# Patient Record
Sex: FEMALE | Race: OTHER
[De-identification: names, ages, dates, MRNs, and addresses within clinical notes are randomized per-mention and may not be internally consistent; named-entity substitution may affect disease eponyms.]

---

## 2017-06-21 ENCOUNTER — HOSPITAL ENCOUNTER (EMERGENCY)
Dept: HOSPITAL 61 - ER | Age: 22
Discharge: HOME | End: 2017-06-21
Payer: COMMERCIAL

## 2017-06-21 VITALS — SYSTOLIC BLOOD PRESSURE: 111 MMHG | DIASTOLIC BLOOD PRESSURE: 72 MMHG

## 2017-06-21 DIAGNOSIS — Z88.0: ICD-10-CM

## 2017-06-21 DIAGNOSIS — J45.909: ICD-10-CM

## 2017-06-21 DIAGNOSIS — L03.031: Primary | ICD-10-CM

## 2017-06-21 PROCEDURE — 99283 EMERGENCY DEPT VISIT LOW MDM: CPT

## 2017-06-21 NOTE — PHYS DOC
Past Medical History


Past Medical History:  Asthma


Past Surgical History:  No Surgical History


Alcohol Use:  None


Drug Use:  None





Adult General


Chief Complaint


Chief Complaint:  TOE PROBLEM





[facility]


HPI





Patient is a 22  year old female presents emergency department stating that her 

right great toe has been having pain and discomfort for the last 2 days. She 

states that she attempted toenails and had pulled piece of skin back and it 

started bleeding. She states that she has had some thick yellow drainage coming 

from the site. She has been using peroxide and Epsom salts soaks with no 

relief. Patient denies taking anything for pain and discomfort. She denies fever

, chills or any nausea vomiting.





Review of Systems


Review of Systems





Constitutional: Denies fever or chills []


Eyes: Denies change in visual acuity, redness, or eye pain []


HENT: Denies nasal congestion or sore throat []


Respiratory: Denies cough or shortness of breath []


Cardiovascular: No additional information not addressed in HPI []


GI: Denies abdominal pain, nausea, vomiting, bloody stools or diarrhea []


: Denies dysuria or hematuria []


Musculoskeletal: Denies back pain or joint pain []


Integument: Denies rash or skin lesions. Complaint of redness with yellow 

drainage and tenderness on the medial part of the right great toe


Neurologic: Denies headache, focal weakness or sensory changes []


Endocrine: Denies polyuria or polydipsia []





Allergies


Allergies





Allergies








Coded Allergies Type Severity Reaction Last Updated Verified


 


  Penicillins Allergy Unknown  2/17/16 Yes











Physical Exam


Physical Exam





Constitutional: Well developed, well nourished, no acute distress, non-toxic 

appearance. []


HENT: Normocephalic, atraumatic, bilateral external ears normal, oropharynx 

moist, no oral exudates, nose normal. []


Eyes: PERRLA, EOMI, conjunctiva normal, no discharge. [] 


Neck: Normal range of motion, no tenderness, supple, no stridor. [] 


Cardiovascular:Heart rate regular rhythm


Lungs & Thorax: No respiratory distress noted 


Skin: Warm, dry, no erythema, no rash. [] 


Back: No tenderness


Extremities: No tenderness, no cyanosis, no clubbing, ROM intact, no edema. 

Patient's right great toe on the medial part appears to be inflamed with 

tenderness noted, minimal drainage noted from the area that appears to be clear 

to yellowish in color.


Neurologic: Alert and oriented X 3, normal motor function, normal sensory 

function, no focal deficits noted. []


Psychologic: Affect normal, judgement normal, mood normal. []





Current Patient Data


Vital Signs





 Vital Signs








  Date Time  Temp Pulse Resp B/P (MAP) Pulse Ox O2 Delivery O2 Flow Rate FiO2


 


6/21/17 22:51 98.8 78 19  99 Room Air  





 98.8       











EKG


EKG


[]





Radiology/Procedures


Radiology/Procedures


[]





Course & Med Decision Making


Course & Med Decision Making


Pertinent Labs and Imaging studies reviewed. (See chart for details)


Patient will be provided with Keflex for infection and she is breast-feeding. 

She'll be instructed to use Tylenol or ibuprofen for pain and discomfort. 

Continue with the warm Epsom salt soaks 4-5 times a day. Also recommended not 

using peroxide as it has been known to delay healing. Patient will be 

discharged home in stable condition. Signs and symptoms to return back to 

emergency department as been provided. Patient agrees with discharge 

instructions treatment regimens and follow-up recommendations.


[]





Dragon Disclaimer


Dragon Disclaimer


This electronic medical record was generated, in whole or in part, using a 

voice recognition dictation system.





Departure


Departure


Impression:  


 Primary Impression:  


 Paronychia of great toe, right


Disposition:  01 HOME, SELF-CARE


Condition:  STABLE


Referrals:  


NO PCP (PCP)


Patient Instructions:  Paronychia, Easy-to-Read





Additional Instructions:  


Activity as tolerated.


Medications as prescribed.


Tylenol for pain and discomfort.


Elevation as much as possible.


Warm Epsom salt soaks 4-5 times a day for 20 minutes at a time.


Avoid using peroxide on the areas as delays healing.


You may apply antibiotic ointment to the area after cleaning it with soap and 

water.


Follow-up to primary care physician in the next 3-5 days.


Return back to emergency department for signs and symptoms become worse.


Scripts


Cephalexin (CEPHALEXIN) 500 Mg Tablet


1 TAB PO BID, #20 TAB


   Prov: NISA HART         6/21/17











NISA HART Jun 21, 2017 23:03

## 2018-01-08 ENCOUNTER — HOSPITAL ENCOUNTER (EMERGENCY)
Dept: HOSPITAL 61 - ER | Age: 23
Discharge: HOME | End: 2018-01-08
Payer: SELF-PAY

## 2018-01-08 DIAGNOSIS — S39.012A: Primary | ICD-10-CM
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
## 2018-01-08 DIAGNOSIS — J45.909: ICD-10-CM
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
## 2018-01-08 DIAGNOSIS — Z88.0: ICD-10-CM
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
## 2018-01-08 LAB
BACTERIA #/AREA URNS HPF: (no result) /HPF
BILIRUBIN,URINE: NEGATIVE
CLARITY,URINE: CLEAR
COLOR,URINE: YELLOW
GLUCOSE,URINE: NEGATIVE MG/DL
NITRITE UR QL STRIP: NEGATIVE
PH,URINE: 5
PROTEIN,URINE: NEGATIVE MG/DL
RBC,URINE: 0 /HPF (ref 0–2)
SPECIFIC GRAVITY,URINE: 1.02
SQUAMOUS EPITHELIAL CELL,UR: (no result) /LPF
URINE HCG POC: (no result)
UROBILINOGEN,URINE: 0.2 MG/DL
WBC #/AREA URNS HPF: (no result) /HPF (ref 0–4)

## 2018-01-08 PROCEDURE — 71046 X-RAY EXAM CHEST 2 VIEWS: CPT

## 2018-01-08 PROCEDURE — 81001 URINALYSIS AUTO W/SCOPE: CPT

## 2018-01-08 PROCEDURE — 81025 URINE PREGNANCY TEST: CPT

## 2018-01-08 PROCEDURE — 72100 X-RAY EXAM L-S SPINE 2/3 VWS: CPT

## 2018-01-08 PROCEDURE — 99285 EMERGENCY DEPT VISIT HI MDM: CPT

## 2018-01-08 RX ADMIN — HYDROCODONE BITARTRATE AND ACETAMINOPHEN 1 TAB: 5; 325 TABLET ORAL at 14:28

## 2018-08-10 ENCOUNTER — HOSPITAL ENCOUNTER (EMERGENCY)
Dept: HOSPITAL 61 - ER | Age: 23
Discharge: HOME | End: 2018-08-10
Payer: SELF-PAY

## 2018-08-10 DIAGNOSIS — O03.9: Primary | ICD-10-CM

## 2018-08-10 DIAGNOSIS — Z88.0: ICD-10-CM

## 2018-08-10 DIAGNOSIS — Z3A.00: ICD-10-CM

## 2018-08-10 DIAGNOSIS — O99.519: ICD-10-CM

## 2018-08-10 DIAGNOSIS — J45.909: ICD-10-CM

## 2018-08-10 LAB
ADD MAN DIFF?: NO
ALBUMIN SERPL-MCNC: 3.8 G/DL (ref 3.4–5)
ALBUMIN/GLOB SERPL: 1.2 {RATIO} (ref 1–1.7)
ALP SERPL-CCNC: 77 U/L (ref 46–116)
ALT (SGPT): 21 U/L (ref 14–59)
ANION GAP SERPL CALC-SCNC: 10 MMOL/L (ref 6–14)
AST SERPL-CCNC: 18 U/L (ref 15–37)
BASO #: 0 X10^3/UL (ref 0–0.2)
BASO %: 0 % (ref 0–3)
BLOOD UREA NITROGEN: 7 MG/DL (ref 7–20)
BUN/CREAT SERPL: 14 (ref 6–20)
CALCIUM: 8.5 MG/DL (ref 8.5–10.1)
CHLORIDE: 102 MMOL/L (ref 98–107)
CO2 SERPL-SCNC: 24 MMOL/L (ref 21–32)
CREAT SERPL-MCNC: 0.5 MG/DL (ref 0.6–1)
EOS #: 0.4 X10^3/UL (ref 0–0.7)
EOS %: 4 % (ref 0–3)
GFR SERPLBLD BASED ON 1.73 SQ M-ARVRAT: 155.7 ML/MIN
GLOBULIN SER-MCNC: 3.3 G/DL (ref 2.2–3.8)
GLUCOSE SERPL-MCNC: 104 MG/DL (ref 70–99)
HCG SERPL-ACNC: 9.6 X10^3/UL (ref 4–11)
HEMATOCRIT: 37.7 % (ref 36–47)
HEMOGLOBIN: 12.7 G/DL (ref 12–15.5)
INR: 1.1 (ref 0.8–1.1)
LYMPH #: 2.2 X10^3/UL (ref 1–4.8)
LYMPH %: 23 % (ref 24–48)
MEAN CORPUSCULAR HEMOGLOBIN: 29 PG (ref 25–35)
MEAN CORPUSCULAR HGB CONC: 34 G/DL (ref 31–37)
MEAN CORPUSCULAR VOLUME: 85 FL (ref 79–100)
MONO #: 0.8 X10^3/UL (ref 0–1.1)
MONO %: 8 % (ref 0–9)
NEUT #: 6.3 X10^3UL (ref 1.8–7.7)
NEUT %: 65 % (ref 31–73)
PARTIAL THROMBOPLASTIN TIME: 30 SEC (ref 24–38)
PLATELET COUNT: 194 X10^3/UL (ref 140–400)
POTASSIUM SERPL-SCNC: 3.5 MMOL/L (ref 3.5–5.1)
PROTHROMBIN TIME PATIENT: 13.5 SEC (ref 11.7–14)
RED BLOOD COUNT: 4.45 X10^6/UL (ref 3.5–5.4)
RED CELL DISTRIBUTION WIDTH: 14.2 % (ref 11.5–14.5)
SODIUM: 136 MMOL/L (ref 136–145)
TOTAL BILIRUBIN: 0.4 MG/DL (ref 0.2–1)
TOTAL PROTEIN: 7.1 G/DL (ref 6.4–8.2)

## 2018-08-10 PROCEDURE — 85025 COMPLETE CBC W/AUTO DIFF WBC: CPT

## 2018-08-10 PROCEDURE — 99285 EMERGENCY DEPT VISIT HI MDM: CPT

## 2018-08-10 PROCEDURE — 76801 OB US < 14 WKS SINGLE FETUS: CPT

## 2018-08-10 PROCEDURE — 85610 PROTHROMBIN TIME: CPT

## 2018-08-10 PROCEDURE — 84702 CHORIONIC GONADOTROPIN TEST: CPT

## 2018-08-10 PROCEDURE — 86901 BLOOD TYPING SEROLOGIC RH(D): CPT

## 2018-08-10 PROCEDURE — 86900 BLOOD TYPING SEROLOGIC ABO: CPT

## 2018-08-10 PROCEDURE — 85730 THROMBOPLASTIN TIME PARTIAL: CPT

## 2018-08-10 PROCEDURE — 80053 COMPREHEN METABOLIC PANEL: CPT

## 2018-08-10 PROCEDURE — 76817 TRANSVAGINAL US OBSTETRIC: CPT

## 2018-08-10 PROCEDURE — 36415 COLL VENOUS BLD VENIPUNCTURE: CPT

## 2018-08-10 RX ADMIN — BACITRACIN 1 MLS/HR: 5000 INJECTION, POWDER, FOR SOLUTION INTRAMUSCULAR at 11:11

## 2019-03-04 ENCOUNTER — HOSPITAL ENCOUNTER (EMERGENCY)
Dept: HOSPITAL 61 - ER | Age: 24
Discharge: HOME | End: 2019-03-04
Payer: COMMERCIAL

## 2019-03-04 VITALS — BODY MASS INDEX: 30.18 KG/M2 | HEIGHT: 62 IN | WEIGHT: 164 LBS

## 2019-03-04 VITALS — SYSTOLIC BLOOD PRESSURE: 125 MMHG | DIASTOLIC BLOOD PRESSURE: 60 MMHG

## 2019-03-04 DIAGNOSIS — Z3A.16: ICD-10-CM

## 2019-03-04 DIAGNOSIS — O44.02: ICD-10-CM

## 2019-03-04 DIAGNOSIS — O99.512: ICD-10-CM

## 2019-03-04 DIAGNOSIS — Z88.0: ICD-10-CM

## 2019-03-04 DIAGNOSIS — O23.42: Primary | ICD-10-CM

## 2019-03-04 DIAGNOSIS — J45.909: ICD-10-CM

## 2019-03-04 LAB
AMORPH SED URNS QL MICRO: PRESENT /HPF
APTT PPP: YELLOW S
BACTERIA #/AREA URNS HPF: (no result) /HPF
BILIRUB UR QL STRIP: NEGATIVE
FIBRINOGEN PPP-MCNC: (no result) MG/DL
NITRITE UR QL STRIP: NEGATIVE
PH UR STRIP: 7 [PH]
PROT UR STRIP-MCNC: NEGATIVE MG/DL
RBC #/AREA URNS HPF: 0 /HPF (ref 0–2)
SQUAMOUS #/AREA URNS LPF: (no result) /LPF
UROBILINOGEN UR-MCNC: 1 MG/DL

## 2019-03-04 PROCEDURE — 81025 URINE PREGNANCY TEST: CPT

## 2019-03-04 PROCEDURE — 87086 URINE CULTURE/COLONY COUNT: CPT

## 2019-03-04 PROCEDURE — 76815 OB US LIMITED FETUS(S): CPT

## 2019-03-04 PROCEDURE — 81001 URINALYSIS AUTO W/SCOPE: CPT

## 2019-03-04 NOTE — PHYS DOC
Past Medical History


Past Medical History:  Asthma


Past Surgical History:  No Surgical History


Alcohol Use:  None


Drug Use:  None





Adult General


Chief Complaint


Chief Complaint:  ABDOMINAL PAIN IN PREGNANCY





HPI


HPI





Patient is a 23  year old female who presents with lower abdominal pain in 

pregnancy. The patient states she is 16 weeks pregnant. She denies vaginal 

discharge or bleeding. She denies fever, nausea or vomiting.





Review of Systems


Review of Systems





Constitutional: Denies fever or chills []


Respiratory: Denies cough or shortness of breath []


Cardiovascular: No additional information not addressed in HPI []


GI: See HPI


: See HPI


Musculoskeletal: Denies back pain or joint pain []


Integument: Denies rash or skin lesions []


Neurologic: Denies headache, focal weakness or sensory changes []


Endocrine: Denies polyuria or polydipsia []





All other systems were reviewed and found to be within normal limits, except as 

documented in this note.





Allergies


Allergies





Allergies








Coded Allergies Type Severity Reaction Last Updated Verified


 


  Penicillins Allergy Intermediate  19 Yes











Physical Exam


Physical Exam





Constitutional: Well developed, well nourished, no acute distress, non-toxic 

appearance. []


Cardiovascular:Heart rate regular rhythm, no murmur []


Lungs & Thorax:  Bilateral breath sounds clear to auscultation []


Abdomen: Bowel sounds normal, soft, mild suprapubic cramping tenderness, no 

masses, no pulsatile masses. [] 


Skin: Warm, dry, no erythema, no rash. [] 


Back: No tenderness, no CVA tenderness. [] 


Extremities: No tenderness, no cyanosis, no clubbing, ROM intact, no edema. [] 


Neurologic: Alert and oriented X 3, normal motor function, normal sensory 

function, no focal deficits noted. []


Psychologic: Affect normal, judgement normal, mood normal. []





Current Patient Data


Vital Signs





Lab Values





                                Laboratory Tests








Test


 3/4/19


17:15 3/4/19


18:25


 


Urine Collection Type Unknown   


 


Urine Color Yellow   


 


Urine Clarity Turbid   


 


Urine pH 7.0   


 


Urine Specific Gravity 1.020   


 


Urine Protein


 Negative mg/dL


(NEG-TRACE) 





 


Urine Glucose (UA)


 Negative mg/dL


(NEG) 





 


Urine Ketones (Stick)


 Negative mg/dL


(NEG) 





 


Urine Blood


 Negative (NEG)


 





 


Urine Nitrite


 Negative (NEG)


 





 


Urine Bilirubin


 Negative (NEG)


 





 


Urine Urobilinogen Dipstick


 1.0 mg/dL (0.2


mg/dL) 





 


Urine Leukocyte Esterase


 Moderate (NEG)


 





 


Urine RBC 0 /HPF (0-2)   


 


Urine WBC


 11-20 /HPF


(0-4) 





 


Urine Squamous Epithelial


Cells Many /LPF  


 





 


Urine Amorphous Sediment Present /HPF   


 


Urine Bacteria


 Moderate /HPF


(0-FEW) 





 


POC Urine HCG, Qualitative


 


 Hcg positive


(Negative)








Microbiology


3/4/19 Urine Culture - Final, Complete


         


3/4/19 Urine Culture Result 1 (TINO) - Final, Complete


         








EKG


EKG


[]





Radiology/Procedures


Radiology/Procedures


[]


PATIENT: CHIRAG GUADARRAMA    ACCOUNT: [id number]     MRN#: [id number]


: 1995           LOCATION: ER              AGE: 23


SEX: F                    EXAM DT: 19         ACCESSION#: [id number]


STATUS: REG ER            ORD. PHYSICIAN: RENETTA FRY   


REASON: cramping, pelvic pain


PROCEDURE: OB LIMITED





OB LIMITED


 


History: Pelvic pain on the left, pregnancy, urinary tract infection, 


cramping


 


Comparison: 2019


 


Findings:


Multiple transabdominal sonographic images of the pelvis are submitted. 


Uterus measured 12.6 x 8.8 x 16.7 cm. There is a single intrauterine 


fetus. There is demonstrable fetal cardiac activity 157 bpm. There is 


breech presentation. Cervix measured 4.4 cm. Fetal anatomy is not well 


visualized at this age of the pregnancy. Left maternal ovary measured 3.5 


x 1.5 x 1.5 cm with normal low resistance vascularity. Right ovary is not 


well-visualized. There is posterior placenta, also apparently complete 


placenta previa.


 


Biometry data are as follows: 


Biparietal diameter 3.23 cm corresponding 16 weeks 1 day


Head circumference 12.59 cm corresponding with 16 weeks 3 days


Abdominal circumference 10.26 cm corresponding 16 weeks 2 days


Femur length 2.02 cm corresponds weeks 0 days


 


HC/AC ratio 1.23


Estimated fetal weight 147 g +/- 22 g


 


Adjusted ultrasound age 16 weeks 2 days with estimated delivery date 


2019. LMP age 15 weeks 6 days with estimated delivery date of 


2019. There has been adequate interval growth.


 


Impression: 


 


1.  There is posterior placenta and apparently complete placenta previa 


for which follow-up advised.


2. There is breech presentation of the single viable intrauterine fetus.


 


Electronically signed by: Robin Ramirez MD (3/4/2019 7:44 PM) [facility]














DICTATED and SIGNED BY:     ROBIN RAMIREZ MD


DATE:     19





Course & Med Decision Making


Course & Med Decision Making


Pertinent Labs and Imaging studies reviewed. (See chart for details)





[]The patient is positive for a UTI.





Dragon Disclaimer


Dragon Disclaimer


This electronic medical record was generated, in whole or in part, using a voice

 recognition dictation system.





Departure


Departure


Impression:  


   Primary Impression:  


   Abdominal pain during pregnancy


   Additional Impression:  


   Urinary tract infection


Disposition:   HOME, SELF-CARE


Condition:  STABLE


Referrals:  


NO PCP (PCP)


Patient Instructions:  Abdominal Pain During Pregnancy, Pregnancy - Urinary Tr

act Infection





Additional Instructions:  


Take medication as directed. Increase fluids and rest. Follow-up with Dr. Hendricks

 and make sure that he is aware of your ultrasound report.


Scripts


Nitrofurantoin Monohyd/M-Cryst (MACROBID 100 MG CAPSULE) 100 Mg Capsule


1 CAP PO BID for UTI, #14 CAP


   Prov: RENETTA FRY         3/4/19





Problem Qualifiers











RENETTA FRY           Mar 4, 2019 19:56

## 2019-03-04 NOTE — RAD
OB LIMITED

 

History: Pelvic pain on the left, pregnancy, urinary tract infection, 

cramping

 

Comparison: January 31, 2019

 

Findings:

Multiple transabdominal sonographic images of the pelvis are submitted. 

Uterus measured 12.6 x 8.8 x 16.7 cm. There is a single intrauterine 

fetus. There is demonstrable fetal cardiac activity 157 bpm. There is 

breech presentation. Cervix measured 4.4 cm. Fetal anatomy is not well 

visualized at this age of the pregnancy. Left maternal ovary measured 3.5 

x 1.5 x 1.5 cm with normal low resistance vascularity. Right ovary is not 

well-visualized. There is posterior placenta, also apparently complete 

placenta previa.

 

Biometry data are as follows: 

Biparietal diameter 3.23 cm corresponding 16 weeks 1 day

Head circumference 12.59 cm corresponding with 16 weeks 3 days

Abdominal circumference 10.26 cm corresponding 16 weeks 2 days

Femur length 2.02 cm corresponds weeks 0 days

 

HC/AC ratio 1.23

Estimated fetal weight 147 g +/- 22 g

 

Adjusted ultrasound age 16 weeks 2 days with estimated delivery date 

8/17/2019. LMP age 15 weeks 6 days with estimated delivery date of 

8/20/2019. There has been adequate interval growth.

 

Impression: 

 

1.  There is posterior placenta and apparently complete placenta previa 

for which follow-up advised.

2. There is breech presentation of the single viable intrauterine fetus.

 

Electronically signed by: Bong Sy MD (3/4/2019 7:44 PM) [facility]

## 2019-03-26 ENCOUNTER — HOSPITAL ENCOUNTER (OUTPATIENT)
Dept: HOSPITAL 61 - US | Age: 24
Discharge: HOME | End: 2019-03-26
Attending: OBSTETRICS & GYNECOLOGY
Payer: COMMERCIAL

## 2019-03-26 DIAGNOSIS — Z3A.19: Primary | ICD-10-CM

## 2019-03-26 DIAGNOSIS — O26.842: ICD-10-CM

## 2019-03-26 PROCEDURE — 76805 OB US >/= 14 WKS SNGL FETUS: CPT

## 2019-03-26 NOTE — RAD
EXAM: Obstetrics sonogram.

 

HISTORY: Size and dates discrepancy. Low-lying placenta.

 

TECHNIQUE: 3/4/2019.

 

COMPARISON: Sonographic imaging of a gravid uterus was performed.

 

FINDINGS: There is a single intrauterine fetus in cephalic presentation 

with a heart rate of 163 bpm. The fetal stomach, kidneys, bladder, spine, 

brain, facial profile and heart are unremarkable. There is a three-vessel 

vocal cord with normal insertion. There is a posterior placenta which 

extends to 3.0 cm from the internal cervical os. The cervix is closed and 

measures 4.7 cm in length. The amniotic fluid index is normal at 14.0 cm.

 

The biparietal diameter is 4.46 cm, corresponding with 19 weeks and 3 

days. The head circumference is 16.4 cm, corresponding with 19 weeks and 1

day. The femoral length is 2.99 cm, corresponding with 19 weeks and 2 

days. The abdominal circumference is 14.45 cm, corresponding with 19 weeks

and 5 days. The estimate a gestational age based on combined ultrasound 

measurements is 19 weeks and 3 days. The estimated fetal weight is 295 g. 

The estimated due date based on ultrasound measurements is 8/17/2019.

 

IMPRESSION:

1. Single intrauterine fetus with an estimated gestational age based on 

ultrasound measurements of 19 weeks and 3 days and heart rate of 163 bpm.

2. Unremarkable fetal anatomy survey.

3. Posterior placenta. There is no evidence of a low-lying placenta or 

placenta previa.

 

Electronically signed by: Sandra Le MD (3/26/2019 11:11 AM) [facility]

## 2019-04-11 ENCOUNTER — HOSPITAL ENCOUNTER (EMERGENCY)
Dept: HOSPITAL 61 - ER | Age: 24
LOS: 1 days | Discharge: HOME | End: 2019-04-12
Payer: COMMERCIAL

## 2019-04-11 VITALS — WEIGHT: 174 LBS | BODY MASS INDEX: 32.02 KG/M2 | HEIGHT: 62 IN

## 2019-04-11 DIAGNOSIS — R60.0: ICD-10-CM

## 2019-04-11 DIAGNOSIS — J45.909: ICD-10-CM

## 2019-04-11 DIAGNOSIS — Z3A.22: ICD-10-CM

## 2019-04-11 DIAGNOSIS — M79.605: ICD-10-CM

## 2019-04-11 DIAGNOSIS — Z88.0: ICD-10-CM

## 2019-04-11 DIAGNOSIS — M79.604: ICD-10-CM

## 2019-04-11 DIAGNOSIS — O99.512: Primary | ICD-10-CM

## 2019-04-11 DIAGNOSIS — R06.00: ICD-10-CM

## 2019-04-11 LAB
ALBUMIN SERPL-MCNC: 3 G/DL (ref 3.4–5)
ALBUMIN/GLOB SERPL: 0.8 {RATIO} (ref 1–1.7)
ALP SERPL-CCNC: 79 U/L (ref 46–116)
ALT SERPL-CCNC: 33 U/L (ref 14–59)
ANION GAP SERPL CALC-SCNC: 11 MMOL/L (ref 6–14)
APTT PPP: YELLOW S
AST SERPL-CCNC: 23 U/L (ref 15–37)
BACTERIA #/AREA URNS HPF: (no result) /HPF
BASOPHILS # BLD AUTO: 0 X10^3/UL (ref 0–0.2)
BASOPHILS NFR BLD: 0 % (ref 0–3)
BILIRUB SERPL-MCNC: 0.3 MG/DL (ref 0.2–1)
BILIRUB UR QL STRIP: NEGATIVE
BUN SERPL-MCNC: 5 MG/DL (ref 7–20)
BUN/CREAT SERPL: 13 (ref 6–20)
CALCIUM SERPL-MCNC: 8.5 MG/DL (ref 8.5–10.1)
CHLORIDE SERPL-SCNC: 102 MMOL/L (ref 98–107)
CO2 SERPL-SCNC: 24 MMOL/L (ref 21–32)
CREAT SERPL-MCNC: 0.4 MG/DL (ref 0.6–1)
EOSINOPHIL NFR BLD: 0.5 X10^3/UL (ref 0–0.7)
EOSINOPHIL NFR BLD: 6 % (ref 0–3)
ERYTHROCYTE [DISTWIDTH] IN BLOOD BY AUTOMATED COUNT: 14.1 % (ref 11.5–14.5)
FIBRINOGEN PPP-MCNC: (no result) MG/DL
GFR SERPLBLD BASED ON 1.73 SQ M-ARVRAT: 199.6 ML/MIN
GLOBULIN SER-MCNC: 4 G/DL (ref 2.2–3.8)
GLUCOSE SERPL-MCNC: 106 MG/DL (ref 70–99)
HCT VFR BLD CALC: 36.2 % (ref 36–47)
HGB BLD-MCNC: 12.4 G/DL (ref 12–15.5)
LYMPHOCYTES # BLD: 1.4 X10^3/UL (ref 1–4.8)
LYMPHOCYTES NFR BLD AUTO: 17 % (ref 24–48)
MCH RBC QN AUTO: 30 PG (ref 25–35)
MCHC RBC AUTO-ENTMCNC: 34 G/DL (ref 31–37)
MCV RBC AUTO: 89 FL (ref 79–100)
MONO #: 0.9 X10^3/UL (ref 0–1.1)
MONOCYTES NFR BLD: 11 % (ref 0–9)
NEUT #: 5.4 X10^3UL (ref 1.8–7.7)
NEUTROPHILS NFR BLD AUTO: 66 % (ref 31–73)
NITRITE UR QL STRIP: NEGATIVE
PH UR STRIP: 7 [PH]
PLATELET # BLD AUTO: 163 X10^3/UL (ref 140–400)
POTASSIUM SERPL-SCNC: 3.4 MMOL/L (ref 3.5–5.1)
PROT SERPL-MCNC: 7 G/DL (ref 6.4–8.2)
PROT UR STRIP-MCNC: NEGATIVE MG/DL
RBC # BLD AUTO: 4.09 X10^6/UL (ref 3.5–5.4)
RBC #/AREA URNS HPF: 0 /HPF (ref 0–2)
SODIUM SERPL-SCNC: 137 MMOL/L (ref 136–145)
SQUAMOUS #/AREA URNS LPF: (no result) /LPF
UROBILINOGEN UR-MCNC: 1 MG/DL
WBC # BLD AUTO: 8.3 X10^3/UL (ref 4–11)
WBC #/AREA URNS HPF: (no result) /HPF (ref 0–4)

## 2019-04-11 PROCEDURE — 36415 COLL VENOUS BLD VENIPUNCTURE: CPT

## 2019-04-11 PROCEDURE — 71045 X-RAY EXAM CHEST 1 VIEW: CPT

## 2019-04-11 PROCEDURE — 99285 EMERGENCY DEPT VISIT HI MDM: CPT

## 2019-04-11 PROCEDURE — 85025 COMPLETE CBC W/AUTO DIFF WBC: CPT

## 2019-04-11 PROCEDURE — 87086 URINE CULTURE/COLONY COUNT: CPT

## 2019-04-11 PROCEDURE — 81001 URINALYSIS AUTO W/SCOPE: CPT

## 2019-04-11 PROCEDURE — 80053 COMPREHEN METABOLIC PANEL: CPT

## 2019-04-11 PROCEDURE — 96360 HYDRATION IV INFUSION INIT: CPT

## 2019-04-11 PROCEDURE — 93005 ELECTROCARDIOGRAM TRACING: CPT

## 2019-04-11 NOTE — EKG
[facility]

              [address] [place] [address]

Test Date:    2019               Test Time:    21:17:33

Pat Name:     CHIRAG GUADARRAMA            Department:   

Patient ID:   [id number]           Room:          

Gender:       F                        Technician:   

:          1995               Requested By: OLEG DARBY

Order Number: [id number]           Reading MD:   Romeo Collins MD

                                 Measurements

Intervals                              Axis          

Rate:         101                      P:            41

OH:           116                      QRS:          18

QRSD:         90                       T:            28

QT:           342                                    

QTc:          444                                    

                           Interpretive Statements

SINUS TACHYCARDIA

NON-SPECIFIC ST/T CHANGES

Electronically Signed On 2019 11:06:17 CDT by Romeo Collins MD

## 2019-04-12 VITALS
DIASTOLIC BLOOD PRESSURE: 63 MMHG | DIASTOLIC BLOOD PRESSURE: 63 MMHG | SYSTOLIC BLOOD PRESSURE: 107 MMHG | SYSTOLIC BLOOD PRESSURE: 107 MMHG | SYSTOLIC BLOOD PRESSURE: 107 MMHG | DIASTOLIC BLOOD PRESSURE: 63 MMHG | SYSTOLIC BLOOD PRESSURE: 107 MMHG | DIASTOLIC BLOOD PRESSURE: 63 MMHG

## 2019-04-12 NOTE — PHYS DOC
Past Medical History


Past Medical History:  Asthma, Other


Additional Past Medical Histor:  sainz parkinson white sx


Past Surgical History:  No Surgical History


Alcohol Use:  None


Drug Use:  None





Adult General


Chief Complaint


Chief Complaint:  SHORTNESS OF BREATH





HPI


HPI





Patient is a 24  year old female who was 22 weeks pregnant presented to ER 

today for several complaint. Patient says she was at work today, when she was 

walking she started having cramping in her legs.  She works as a  a security, 

she has to be on her feet all the time. Patient feel like her leg is swollen.  

She also had a cold, sinus congestion. Patient has history of asthma, she had 

trouble breathing off and on, denies any chest pain. Patient denies any 

abdominal pain, no fever, no headache, no pelvic pain, no vaginal bleeding or 

discharge. No history of blood clot disorder, no history of coronary artery 

disease, patient was recently diagnosed with Kaitlyn-Parkinson-White syndrome, 

she is wearing a holter monitor.





Review of Systems


Review of Systems





Constitutional: Denies fever or chills []


Eyes: Denies change in visual acuity, redness, or eye pain []


HENT: Positive for nasal congestion, no sore throat []


Respiratory: Denies cough, positive for shortness of breath []


Cardiovascular: No additional information not addressed in HPI []


GI: Denies abdominal pain, nausea, vomiting, bloody stools or diarrhea []


: Denies dysuria or hematuria []


Musculoskeletal:  POSITIVE FOR LEGS PAIN. 


Integument: Denies rash or skin lesions []


Neurologic: Denies headache, focal weakness or sensory changes []


Endocrine: Denies polyuria or polydipsia []





All other systems were reviewed and found to be within normal limits, except as 

documented in this note.





Current Medications


Current Medications





Current Medications








 Medications


  (Trade)  Dose


 Ordered  Sig/Nery  Start Time


 Stop Time Status Last Admin


Dose Admin


 


 Sodium Chloride  500 ml @ 


 500 mls/hr  1X  ONCE  4/11/19 22:30


 4/11/19 23:29 DC 4/11/19 22:44


500 MLS/HR











Allergies


Allergies





Allergies








Coded Allergies Type Severity Reaction Last Updated Verified


 


  Penicillins Allergy Intermediate  4/11/19 Yes











Physical Exam


Physical Exam





Constitutional: Well developed, well nourished, no acute distress, non-toxic 

appearance. []


HENT: Normocephalic, atraumatic, bilateral external ears normal, oropharynx 

moist, no oral exudates, nose: BILATERAL NARES ERYTHEMA AND EDEMA.  


Eyes: PERRLA, EOMI, conjunctiva normal, no discharge. [] 


Neck: Normal range of motion, no tenderness, supple, no stridor. [] 


Cardiovascular:Heart rate regular rhythm, no murmur []


Lungs & Thorax:  Bilateral breath sounds clear to auscultation []


Abdomen: Bowel sounds normal, soft, no tenderness, no masses, no pulsatile 

masses. [] 


Skin: Warm, dry, no erythema, no rash. [] 


Back: No tenderness, no CVA tenderness. [] 


Extremities: No tenderness, no cyanosis, no clubbing, ROM intact, no edema.  NO 

LEGS SWELLING, NO PEDAL EDEMA, NO CALF TENDERNESS.  


Neurologic: Alert and oriented X 3, normal motor function, normal sensory 

function, no focal deficits noted. []


Psychologic: Affect normal, judgement normal, mood normal. []





Current Patient Data


Vital Signs





 Vital Signs








  Date Time  Temp Pulse Resp B/P (MAP) Pulse Ox O2 Delivery O2 Flow Rate FiO2


 


4/12/19 00:23  90 22 107/63 (78) 94 Room Air  


 


4/11/19 21:15 98.5       





 98.5       








Lab Values





 Laboratory Tests








Test


 4/11/19


22:40 4/11/19


23:20


 


White Blood Count


 8.3 x10^3/uL


(4.0-11.0) 





 


Red Blood Count


 4.09 x10^6/uL


(3.50-5.40) 





 


Hemoglobin


 12.4 g/dL


(12.0-15.5) 





 


Hematocrit


 36.2 %


(36.0-47.0) 





 


Mean Corpuscular Volume


 89 fL ()


 





 


Mean Corpuscular Hemoglobin 30 pg (25-35)   


 


Mean Corpuscular Hemoglobin


Concent 34 g/dL


(31-37) 





 


Red Cell Distribution Width


 14.1 %


(11.5-14.5) 





 


Platelet Count


 163 x10^3/uL


(140-400) 





 


Neutrophils (%) (Auto) 66 % (31-73)   


 


Lymphocytes (%) (Auto) 17 % (24-48)  L 


 


Monocytes (%) (Auto) 11 % (0-9)  H 


 


Eosinophils (%) (Auto) 6 % (0-3)  H 


 


Basophils (%) (Auto) 0 % (0-3)   


 


Neutrophils # (Auto)


 5.4 x10^3uL


(1.8-7.7) 





 


Lymphocytes # (Auto)


 1.4 x10^3/uL


(1.0-4.8) 





 


Monocytes # (Auto)


 0.9 x10^3/uL


(0.0-1.1) 





 


Eosinophils # (Auto)


 0.5 x10^3/uL


(0.0-0.7) 





 


Basophils # (Auto)


 0.0 x10^3/uL


(0.0-0.2) 





 


Sodium Level


 137 mmol/L


(136-145) 





 


Potassium Level


 3.4 mmol/L


(3.5-5.1)  L 





 


Chloride Level


 102 mmol/L


() 





 


Carbon Dioxide Level


 24 mmol/L


(21-32) 





 


Anion Gap 11 (6-14)   


 


Blood Urea Nitrogen


 5 mg/dL (7-20)


L 





 


Creatinine


 0.4 mg/dL


(0.6-1.0)  L 





 


Estimated GFR


(Cockcroft-Gault) 199.6  


 





 


BUN/Creatinine Ratio 13 (6-20)   


 


Glucose Level


 106 mg/dL


(70-99)  H 





 


Calcium Level


 8.5 mg/dL


(8.5-10.1) 





 


Total Bilirubin


 0.3 mg/dL


(0.2-1.0) 





 


Aspartate Amino Transferase


(AST) 23 U/L (15-37)


 





 


Alanine Aminotransferase (ALT)


 33 U/L (14-59)


 





 


Alkaline Phosphatase


 79 U/L


() 





 


Total Protein


 7.0 g/dL


(6.4-8.2) 





 


Albumin


 3.0 g/dL


(3.4-5.0)  L 





 


Albumin/Globulin Ratio


 0.8 (1.0-1.7)


L 





 


Urine Collection Type  Void  


 


Urine Color  Yellow  


 


Urine Clarity  Cloudy  


 


Urine pH  7.0  


 


Urine Specific Gravity  1.025  


 


Urine Protein


 


 Negative mg/dL


(NEG-TRACE)


 


Urine Glucose (UA)


 


 Negative mg/dL


(NEG)


 


Urine Ketones (Stick)


 


 Negative mg/dL


(NEG)


 


Urine Blood


 


 Negative (NEG)





 


Urine Nitrite


 


 Negative (NEG)





 


Urine Bilirubin


 


 Negative (NEG)





 


Urine Urobilinogen Dipstick


 


 1.0 mg/dL (0.2


mg/dL)


 


Urine Leukocyte Esterase  Small (NEG)  


 


Urine RBC  0 /HPF (0-2)  


 


Urine WBC


 


 1-4 /HPF (0-4)





 


Urine Squamous Epithelial


Cells 


 Many /LPF  





 


Urine Bacteria


 


 Many /HPF


(0-FEW)


 


Urine Mucus  Marked /LPF  





 Laboratory Tests


4/11/19 22:40








 Laboratory Tests


4/11/19 22:40














EKG


EKG


EKG was done, read by this physician,  rate of 101, sinus tachycardia. []





Radiology/Procedures


Radiology/Procedures


CHEST XRAY:  NO ACUTE DISEASE.





Course & Med Decision Making


Course & Med Decision Making


Pertinent Labs and Imaging studies reviewed. (See chart for details)





FHT: 140 BPM.


PATIENT'S OXYGEN SATURATION % ON ROOM AIR,  SHE HAS NO CHEST PAIN, NO 

SHORTNES OF AIR AT THIS TIME, NO CLINICAL EVIDENCE OF DVT OR PE.





Dragon Disclaimer


Dragon Disclaimer


This electronic medical record was generated, in whole or in part, using a 

voice recognition dictation system.





Departure


Departure


Impression:  


 Primary Impression:  


 Dyspnea


Disposition:  01 HOME, SELF-CARE


Condition:  STABLE


Referrals:  


NO PCP (PCP)


follow up with your OB/GYN DOCTOR FOR REEVALUATION


Patient Instructions:  Shortness of Breath, Easy-to-Read











OLEG DARBY DO Apr 12, 2019 00:04

## 2019-04-12 NOTE — RAD
CHEST AP ONLY

 

Clinical Indication: soa, sinus congestion, hx of asthma,  22 weeks preg, 

double shielded. 

 

Comparison:  1/8/2018 two-view chest x-ray exam.

 

Findings: 

Electronic device overlies the left mid thoracic level. The 

cardiomediastinal silhouette is normal. Lungs are clear. There is no 

pneumothorax. No pleural effusion is appreciated. No acute bone 

abnormality.

 

IMPRESSION: 

No acute cardiopulmonary process.

 

 

Electronically signed by: Lamont Gonsalves MD (4/12/2019 7:35 AM) [facility]

## 2019-05-07 ENCOUNTER — HOSPITAL ENCOUNTER (OUTPATIENT)
Dept: HOSPITAL 61 - ECHO | Age: 24
Discharge: HOME | End: 2019-05-07
Attending: INTERNAL MEDICINE
Payer: COMMERCIAL

## 2019-05-07 DIAGNOSIS — R06.09: Primary | ICD-10-CM

## 2019-05-07 DIAGNOSIS — J45.998: ICD-10-CM

## 2019-05-07 PROCEDURE — 93306 TTE W/DOPPLER COMPLETE: CPT

## 2019-05-07 NOTE — CARD
MR#: [id number]

Account#: [id number]

Accession#: [id number]

Date of Study: 05/07/2019

Ordering Physician: GUERITA COLLINS, 

Referring Physician: GUERITA COLLINS, 

Tech: Anastasia Hernandez





--------------- APPROVED REPORT --------------





EXAM: Two-dimensional and M-mode echocardiogram with Doppler and color Doppler.



Other Information 

Quality : GoodHR: 88bpm



INDICATION

Dyspnea 



RISK FACTORS

Asthma



2D DIMENSIONS 

RVDd2.2 (2.9-3.5cm)Left Atrium(2D)3.4 (1.6-4.0cm)

IVSd0.9 (0.7-1.1cm)Aortic Root(2D)2.6 (2.0-3.7cm)

LVDd4.7 (3.9-5.9cm)LVOT Diameter2.1 (1.8-2.4cm)

PWd0.8 (0.7-1.1cm)LVDs3.6 (2.5-4.0cm)

FS (%) 24.8 %SV51.3 ml

LVEF(%)49.1 (>50%)



Aortic Valve

AoV Peak Yo.141.0cm/sAoV VTI23.4cm

AO Peak GR.7.9mmHgLVOT Peak Yo.100.5cm/s

LVOT  VTI 17.46cmAO Mean GR.4mmHg

MOLLY (VMAX)1.15mj9CJK   (VTI)2.68cm2



Mitral Valve

MV E Hlngwgta70.7cm/sMV DECEL KRAV025ep

MV A Nbitvlpe38.3cm/sMV CXW22ml

E/A  Ratio1.5MVA (PHT)5.21cm2



TDI

E/Lateral E'5.1E/Medial E'8.0



Pulmonary Valve

PV Peak Jfvghahb92.7cm/sPV Peak Grad.4mmHg



Tricuspid Valve

TR P. Tyckdrdw688vh/sRAP TLCQHYSS1awCy

TR Peak Gr.57wmYmQQQO46qkOi



Pulmonary Vein

S1 Ogdnoeoq43.4cm/sD2 Rsbocrkg47.4cm/s

PVa ranlrljb410ctrj



 LEFT VENTRICLE 

The left ventricle is normal size. There is normal left ventricular wall thickness. The left ventricu
lar systolic function is normal and the ejection fraction is within normal range. The Ejection Fracti
on is 50-55%. There is normal LV segmental wall motion. The left ventricular diastolic function and f
illing is normal for age.



 RIGHT VENTRICLE 

The right ventricle is normal size. There is normal right ventricular wall thickness. The right ventr
icular systolic function is normal.



 ATRIA 

The left atrium size is normal. The right atrium size is normal. The interatrial septum is intact wit
h no evidence for an atrial septal defect or patent foramen ovale as noted on 2-D or Doppler imaging.




 AORTIC VALVE 

The aortic valve is normal in structure and function. Doppler and Color Flow revealed no significant 
aortic regurgitation. There is no significant aortic valvular stenosis.



 MITRAL VALVE 

The mitral valve is normal in structure and function. There is no evidence of mitral valve prolapse. 
There is no mitral valve stenosis. Doppler and Color-flow revealed trace mitral regurgitation.



 TRICUSPID VALVE 

The tricuspid valve is normal in structure and function. Doppler and Color Flow revealed trace tricus
pid regurgitation with an estimated PAP of 24 mmHg. There is no tricuspid valve stenosis.



 PULMONIC VALVE 

The pulmonary valve is normal in structure and function. Doppler and Color Flow revealed trace pulmon
ic valvular regurgitation. There is no pulmonic valvular stenosis.



 GREAT VESSELS 

The aortic root is normal in size. The IVC is normal in size and collapses >50% with inspiration.



 PERICARDIAL EFFUSION 

There is no evidence of significant pericardial effusion.



Critical Notification

Critical Value: No



<Conclusion>

The left ventricular systolic function is normal and the ejection fraction is within normal range. Th
e Ejection Fraction is 50-55%.

There is normal LV segmental wall motion.



Signed by : Guertia Collins, 

Electronically Approved : 05/07/2019 11:38:31

## 2019-05-31 ENCOUNTER — HOSPITAL ENCOUNTER (OUTPATIENT)
Dept: HOSPITAL 61 - 3 SO LND | Age: 24
Setting detail: OBSERVATION
Discharge: HOME | End: 2019-05-31
Attending: OBSTETRICS & GYNECOLOGY | Admitting: OBSTETRICS & GYNECOLOGY
Payer: COMMERCIAL

## 2019-05-31 DIAGNOSIS — Z3A.28: ICD-10-CM

## 2019-05-31 DIAGNOSIS — O36.8130: Primary | ICD-10-CM

## 2019-05-31 LAB
AMPHETAMINE/METHAMPHETAMINE: (no result)
APTT PPP: YELLOW S
BACTERIA #/AREA URNS HPF: (no result) /HPF
BARBITURATES UR-MCNC: (no result) UG/ML
BENZODIAZ UR-MCNC: (no result) UG/L
BILIRUB UR QL STRIP: NEGATIVE
CANNABINOIDS UR-MCNC: (no result) UG/L
COCAINE UR-MCNC: (no result) NG/ML
FIBRINOGEN PPP-MCNC: CLEAR MG/DL
METHADONE SERPL-MCNC: (no result) NG/ML
NITRITE UR QL STRIP: NEGATIVE
OPIATES UR-MCNC: (no result) NG/ML
PCP SERPL-MCNC: (no result) MG/DL
PH UR STRIP: 6 [PH]
PROT UR STRIP-MCNC: NEGATIVE MG/DL
RBC #/AREA URNS HPF: (no result) /HPF (ref 0–2)
SQUAMOUS #/AREA URNS LPF: (no result) /LPF
UROBILINOGEN UR-MCNC: 0.2 MG/DL
WBC #/AREA URNS HPF: (no result) /HPF (ref 0–4)

## 2019-05-31 PROCEDURE — 80307 DRUG TEST PRSMV CHEM ANLYZR: CPT

## 2019-05-31 PROCEDURE — 87086 URINE CULTURE/COLONY COUNT: CPT

## 2019-05-31 PROCEDURE — G0379 DIRECT REFER HOSPITAL OBSERV: HCPCS

## 2019-05-31 PROCEDURE — 81001 URINALYSIS AUTO W/SCOPE: CPT

## 2019-06-06 ENCOUNTER — HOSPITAL ENCOUNTER (INPATIENT)
Dept: HOSPITAL 61 - 3 SO LND | Age: 24
LOS: 2 days | Discharge: HOME | DRG: 833 | End: 2019-06-08
Attending: OBSTETRICS & GYNECOLOGY | Admitting: OBSTETRICS & GYNECOLOGY
Payer: COMMERCIAL

## 2019-06-06 VITALS — BODY MASS INDEX: 33.86 KG/M2 | HEIGHT: 62 IN | WEIGHT: 184 LBS

## 2019-06-06 DIAGNOSIS — O99.513: ICD-10-CM

## 2019-06-06 DIAGNOSIS — O23.03: Primary | ICD-10-CM

## 2019-06-06 DIAGNOSIS — Z3A.29: ICD-10-CM

## 2019-06-06 DIAGNOSIS — J45.909: ICD-10-CM

## 2019-06-06 LAB
ALBUMIN SERPL-MCNC: 2.8 G/DL (ref 3.4–5)
ALBUMIN/GLOB SERPL: 0.7 {RATIO} (ref 1–1.7)
ALP SERPL-CCNC: 99 U/L (ref 46–116)
ALT SERPL-CCNC: 30 U/L (ref 14–59)
AMNIO PT: NEGATIVE
AMPHETAMINE/METHAMPHETAMINE: (no result)
ANION GAP SERPL CALC-SCNC: 13 MMOL/L (ref 6–14)
APTT PPP: YELLOW S
AST SERPL-CCNC: 28 U/L (ref 15–37)
BACTERIA #/AREA URNS HPF: (no result) /HPF
BARBITURATES UR-MCNC: (no result) UG/ML
BASOPHILS # BLD AUTO: 0 X10^3/UL (ref 0–0.2)
BASOPHILS NFR BLD: 0 % (ref 0–3)
BENZODIAZ UR-MCNC: (no result) UG/L
BILIRUB SERPL-MCNC: 0.4 MG/DL (ref 0.2–1)
BILIRUB UR QL STRIP: NEGATIVE
BUN SERPL-MCNC: 3 MG/DL (ref 7–20)
BUN/CREAT SERPL: 6 (ref 6–20)
CALCIUM SERPL-MCNC: 8.6 MG/DL (ref 8.5–10.1)
CANNABINOIDS UR-MCNC: (no result) UG/L
CHLORIDE SERPL-SCNC: 101 MMOL/L (ref 98–107)
CO2 SERPL-SCNC: 23 MMOL/L (ref 21–32)
COCAINE UR-MCNC: (no result) NG/ML
CREAT SERPL-MCNC: 0.5 MG/DL (ref 0.6–1)
EOSINOPHIL NFR BLD: 0.1 X10^3/UL (ref 0–0.7)
EOSINOPHIL NFR BLD: 1 % (ref 0–3)
ERYTHROCYTE [DISTWIDTH] IN BLOOD BY AUTOMATED COUNT: 13.2 % (ref 11.5–14.5)
FIBRINOGEN PPP-MCNC: CLEAR MG/DL
GFR SERPLBLD BASED ON 1.73 SQ M-ARVRAT: 154.3 ML/MIN
GLOBULIN SER-MCNC: 3.8 G/DL (ref 2.2–3.8)
GLUCOSE SERPL-MCNC: 82 MG/DL (ref 70–99)
HCT VFR BLD CALC: 36.4 % (ref 36–47)
HGB BLD-MCNC: 12.5 G/DL (ref 12–15.5)
LYMPHOCYTES # BLD: 1 X10^3/UL (ref 1–4.8)
LYMPHOCYTES NFR BLD AUTO: 10 % (ref 24–48)
MCH RBC QN AUTO: 31 PG (ref 25–35)
MCHC RBC AUTO-ENTMCNC: 35 G/DL (ref 31–37)
MCV RBC AUTO: 90 FL (ref 79–100)
METHADONE SERPL-MCNC: (no result) NG/ML
MONO #: 0.8 X10^3/UL (ref 0–1.1)
MONOCYTES NFR BLD: 8 % (ref 0–9)
NEUT #: 8.4 X10^3UL (ref 1.8–7.7)
NEUTROPHILS NFR BLD AUTO: 82 % (ref 31–73)
NITRITE UR QL STRIP: NEGATIVE
OPIATES UR-MCNC: (no result) NG/ML
PCP SERPL-MCNC: (no result) MG/DL
PH UR STRIP: 5.5 [PH]
PLATELET # BLD AUTO: 135 X10^3/UL (ref 140–400)
POTASSIUM SERPL-SCNC: 3.1 MMOL/L (ref 3.5–5.1)
PROT SERPL-MCNC: 6.6 G/DL (ref 6.4–8.2)
PROT UR STRIP-MCNC: NEGATIVE MG/DL
RBC # BLD AUTO: 4.04 X10^6/UL (ref 3.5–5.4)
RBC #/AREA URNS HPF: 0 /HPF (ref 0–2)
SODIUM SERPL-SCNC: 137 MMOL/L (ref 136–145)
SQUAMOUS #/AREA URNS LPF: (no result) /LPF
UROBILINOGEN UR-MCNC: 0.2 MG/DL
WBC # BLD AUTO: 10.3 X10^3/UL (ref 4–11)
WBC #/AREA URNS HPF: (no result) /HPF (ref 0–4)

## 2019-06-06 PROCEDURE — G0379 DIRECT REFER HOSPITAL OBSERV: HCPCS

## 2019-06-06 PROCEDURE — 81001 URINALYSIS AUTO W/SCOPE: CPT

## 2019-06-06 PROCEDURE — 87040 BLOOD CULTURE FOR BACTERIA: CPT

## 2019-06-06 PROCEDURE — 36415 COLL VENOUS BLD VENIPUNCTURE: CPT

## 2019-06-06 PROCEDURE — 84112 EVAL AMNIOTIC FLUID PROTEIN: CPT

## 2019-06-06 PROCEDURE — 80307 DRUG TEST PRSMV CHEM ANLYZR: CPT

## 2019-06-06 PROCEDURE — 87086 URINE CULTURE/COLONY COUNT: CPT

## 2019-06-06 PROCEDURE — G0378 HOSPITAL OBSERVATION PER HR: HCPCS

## 2019-06-06 PROCEDURE — 85025 COMPLETE CBC W/AUTO DIFF WBC: CPT

## 2019-06-06 PROCEDURE — 80053 COMPREHEN METABOLIC PANEL: CPT

## 2019-06-06 RX ADMIN — SODIUM CHLORIDE, SODIUM LACTATE, POTASSIUM CHLORIDE, AND CALCIUM CHLORIDE SCH MLS/HR: .6; .31; .03; .02 INJECTION, SOLUTION INTRAVENOUS at 22:21

## 2019-06-06 RX ADMIN — SODIUM CHLORIDE, SODIUM LACTATE, POTASSIUM CHLORIDE, AND CALCIUM CHLORIDE SCH MLS/HR: .6; .31; .03; .02 INJECTION, SOLUTION INTRAVENOUS at 21:15

## 2019-06-06 RX ADMIN — CEFTRIAXONE SCH GM: 1 INJECTION, POWDER, FOR SOLUTION INTRAMUSCULAR; INTRAVENOUS at 22:45

## 2019-06-07 VITALS — SYSTOLIC BLOOD PRESSURE: 103 MMHG | DIASTOLIC BLOOD PRESSURE: 62 MMHG

## 2019-06-07 VITALS — SYSTOLIC BLOOD PRESSURE: 117 MMHG | DIASTOLIC BLOOD PRESSURE: 66 MMHG

## 2019-06-07 VITALS — DIASTOLIC BLOOD PRESSURE: 86 MMHG | SYSTOLIC BLOOD PRESSURE: 118 MMHG

## 2019-06-07 RX ADMIN — ACETAMINOPHEN PRN MG: 500 TABLET ORAL at 06:00

## 2019-06-07 RX ADMIN — SODIUM CHLORIDE, SODIUM LACTATE, POTASSIUM CHLORIDE, AND CALCIUM CHLORIDE SCH MLS/HR: .6; .31; .03; .02 INJECTION, SOLUTION INTRAVENOUS at 04:10

## 2019-06-07 RX ADMIN — SODIUM CHLORIDE, SODIUM LACTATE, POTASSIUM CHLORIDE, AND CALCIUM CHLORIDE SCH MLS/HR: .6; .31; .03; .02 INJECTION, SOLUTION INTRAVENOUS at 23:41

## 2019-06-07 RX ADMIN — CEFTRIAXONE SCH GM: 1 INJECTION, POWDER, FOR SOLUTION INTRAMUSCULAR; INTRAVENOUS at 22:14

## 2019-06-07 RX ADMIN — SODIUM CHLORIDE, SODIUM LACTATE, POTASSIUM CHLORIDE, AND CALCIUM CHLORIDE SCH MLS/HR: .6; .31; .03; .02 INJECTION, SOLUTION INTRAVENOUS at 09:55

## 2019-06-07 RX ADMIN — ACETAMINOPHEN PRN MG: 500 TABLET ORAL at 15:25

## 2019-06-07 NOTE — PDOC1
OB - History


Hx of Present Pregnancy


Prenatal Care:  Good Care


Ultrasounds:  Normal mid trimester US


Obstetrical Complications:  None


Medical Complications:  Other (asthma)





Past Family/Social History


*


Past Medical, Surgical, Family and Obstetric Histories reviewed from prenatal 

chart.


Rubella:  Immune


RPR/VDRL:  Negative


GBS Status:  Unknown


HBsAG:  Negative





OB - Chief Complaint & HPI


Date of Admission:


Date of Admission:  2019 at 20:45


Chief Complaint/History


:  3


Para:  1


EGA:  29


Reason for admission:  observation (low back pain and fever)


Admission Nurse Assessment Rev:  Yes





OB - Admission Exam


Physical Exam


Vitals:





                          VS - Last 72 Hours, by Label








  Date Time  Temp Pulse Resp B/P (MAP) Pulse Ox O2 Delivery O2 Flow Rate FiO2


 


19 10:12 97.6 93 16 103/62 (76)  Room Air  





 97.6       








HEENT:  Other (dry mucous membranes)


Heart:  Other (tachycardia)


Lungs:  Clear


Abdomen:  Gravid, Soft, Tender (Left flank pain)


Extremities:  Edema


Reflexes:  Normal


Cervical Dilatation:  None


Effacement:  0%


Station:  Ballotable


Membranes:  Intact


Fetal Heart Rate:  Normal


Accelerations:  Accelerations Present


Decelerations:  No decelerations


Contractions on Admission:  None


Text


A: 29 wks IUP


     Pyelonephritis


P: Observation for rehydration and treatment pyelonephritis with IV Rocephin.  

Continue IV abx until afebrile 24-36 hours.  NST daily.











TRUDI DUFF Jr, MD           2019 14:18

## 2019-06-08 VITALS — DIASTOLIC BLOOD PRESSURE: 65 MMHG | SYSTOLIC BLOOD PRESSURE: 105 MMHG

## 2019-06-08 VITALS — DIASTOLIC BLOOD PRESSURE: 69 MMHG | SYSTOLIC BLOOD PRESSURE: 103 MMHG

## 2019-06-08 VITALS — SYSTOLIC BLOOD PRESSURE: 109 MMHG | DIASTOLIC BLOOD PRESSURE: 77 MMHG

## 2019-06-08 LAB
BASOPHILS # BLD AUTO: 0 X10^3/UL (ref 0–0.2)
BASOPHILS NFR BLD: 0 % (ref 0–3)
EOSINOPHIL NFR BLD: 0.2 X10^3/UL (ref 0–0.7)
EOSINOPHIL NFR BLD: 3 % (ref 0–3)
ERYTHROCYTE [DISTWIDTH] IN BLOOD BY AUTOMATED COUNT: 13.3 % (ref 11.5–14.5)
HCT VFR BLD CALC: 35 % (ref 36–47)
HGB BLD-MCNC: 12.2 G/DL (ref 12–15.5)
LYMPHOCYTES # BLD: 1.1 X10^3/UL (ref 1–4.8)
LYMPHOCYTES NFR BLD AUTO: 15 % (ref 24–48)
MCH RBC QN AUTO: 31 PG (ref 25–35)
MCHC RBC AUTO-ENTMCNC: 35 G/DL (ref 31–37)
MCV RBC AUTO: 90 FL (ref 79–100)
MONO #: 0.9 X10^3/UL (ref 0–1.1)
MONOCYTES NFR BLD: 12 % (ref 0–9)
NEUT #: 5.3 X10^3UL (ref 1.8–7.7)
NEUTROPHILS NFR BLD AUTO: 71 % (ref 31–73)
PLATELET # BLD AUTO: 129 X10^3/UL (ref 140–400)
RBC # BLD AUTO: 3.89 X10^6/UL (ref 3.5–5.4)
WBC # BLD AUTO: 7.5 X10^3/UL (ref 4–11)

## 2019-06-08 RX ADMIN — CEFTRIAXONE SCH GM: 1 INJECTION, POWDER, FOR SOLUTION INTRAMUSCULAR; INTRAVENOUS at 18:22

## 2019-06-08 RX ADMIN — SODIUM CHLORIDE, SODIUM LACTATE, POTASSIUM CHLORIDE, AND CALCIUM CHLORIDE SCH MLS/HR: .6; .31; .03; .02 INJECTION, SOLUTION INTRAVENOUS at 07:27

## 2019-06-08 NOTE — PDOC3
OB DISCHARGE SUMMARY


DATE OF ADMISSION:  


6/6/19


DATE OF DISCHARGE:  


6/8/19


REASON FOR ADMISSION:  Other (Pyelonephritis)


PRENATAL PROCEDURES:  NST, Ultrasound, Mgmt of Med Complications, Mgmt of OB 

Complications


POSTPARTUM PROCEDURES:  None


DISCHARGE DIAGNOSIS:  Others (Pyelonephritis)


DISCHARGE INFORMATION:  Activity, Diet


HOSPITAL COURSE


Unremarkable


CONDITION AT DISCHARGE


Stable











AUDREY DOVER MD                Jun 8, 2019 15:50

## 2019-06-08 NOTE — NUR
Discharge

Discharge instructions given to patient at this time, no questions or concerns noted. To 
follow up Wed. in DR Hendricks's office.

## 2020-11-30 ENCOUNTER — HOSPITAL ENCOUNTER (EMERGENCY)
Dept: HOSPITAL 35 - ER | Age: 25
Discharge: HOME | End: 2020-11-30
Payer: COMMERCIAL

## 2020-11-30 VITALS — HEIGHT: 62 IN | BODY MASS INDEX: 30.36 KG/M2 | WEIGHT: 164.99 LBS

## 2020-11-30 VITALS — SYSTOLIC BLOOD PRESSURE: 136 MMHG | DIASTOLIC BLOOD PRESSURE: 86 MMHG

## 2020-11-30 DIAGNOSIS — Z88.0: ICD-10-CM

## 2020-11-30 DIAGNOSIS — L60.0: Primary | ICD-10-CM

## 2021-02-19 ENCOUNTER — HOSPITAL ENCOUNTER (OUTPATIENT)
Dept: HOSPITAL 35 - LAB | Age: 26
End: 2021-02-19
Attending: SPECIALIST
Payer: COMMERCIAL

## 2021-02-19 DIAGNOSIS — Z20.822: Primary | ICD-10-CM

## 2021-06-25 ENCOUNTER — HOSPITAL ENCOUNTER (EMERGENCY)
Dept: HOSPITAL 35 - ER | Age: 26
Discharge: HOME | End: 2021-06-25
Payer: COMMERCIAL

## 2021-06-25 VITALS — WEIGHT: 172 LBS | BODY MASS INDEX: 31.65 KG/M2 | HEIGHT: 62 IN

## 2021-06-25 VITALS — DIASTOLIC BLOOD PRESSURE: 83 MMHG | SYSTOLIC BLOOD PRESSURE: 115 MMHG

## 2021-06-25 DIAGNOSIS — Z88.0: ICD-10-CM
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
## 2021-06-25 DIAGNOSIS — S53.402A: Primary | ICD-10-CM
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
## 2021-06-25 DIAGNOSIS — J45.909: ICD-10-CM
